# Patient Record
Sex: MALE
[De-identification: names, ages, dates, MRNs, and addresses within clinical notes are randomized per-mention and may not be internally consistent; named-entity substitution may affect disease eponyms.]

---

## 2023-04-13 ENCOUNTER — APPOINTMENT (OUTPATIENT)
Dept: ORTHOPEDIC SURGERY | Facility: CLINIC | Age: 23
End: 2023-04-13
Payer: COMMERCIAL

## 2023-04-13 DIAGNOSIS — S93.601A UNSPECIFIED SPRAIN OF RIGHT FOOT, INITIAL ENCOUNTER: ICD-10-CM

## 2023-04-13 PROBLEM — Z00.00 ENCOUNTER FOR PREVENTIVE HEALTH EXAMINATION: Status: ACTIVE | Noted: 2023-04-13

## 2023-04-13 PROCEDURE — 99203 OFFICE O/P NEW LOW 30 MIN: CPT

## 2023-04-13 PROCEDURE — 73630 X-RAY EXAM OF FOOT: CPT | Mod: RT

## 2023-04-13 PROCEDURE — 73610 X-RAY EXAM OF ANKLE: CPT | Mod: RT

## 2023-04-13 NOTE — PHYSICAL EXAM
[Right] : right foot and ankle [] : negative Khan test [FreeTextEntry8] : No pain to palpation throughout ankle/foot [FreeTextEntry9] : Good range of motion throughout with minimal pain to lateral ligaments/area of cuboid [de-identified] : Good strength throughout

## 2023-04-13 NOTE — DISCUSSION/SUMMARY
[de-identified] : Discussed in detail with patient mother that patient has a foot and ankle sprain.  Discussed that these injuries can last 4 to 6 weeks.  Discussed treatment options detail including ice/heat, range of motion exercise, warm water Epsom salt soaks.  Patient placed in Aircast weightbearing as tolerated with supportive sneaker.  Advised against high impact activities.  Advised over-the-counter Motrin/Tylenol as needed.  Call if symptoms worsen or change.  Patient will follow-up in 3 to 4 weeks for reevaluation with foot and ankle department.  Patient understands agrees with plan.  Seen under supervision of Dr. Burk.

## 2023-04-13 NOTE — HISTORY OF PRESENT ILLNESS
[de-identified] : Patient is a 22-year-old male accompanied with mother here for evaluation of right ankle/foot injury.  Patient states on 4/7/2023 he stepped down onto grass at his house and he twisted his right foot/ankle.  Patient states he was immediate pain but able to bear weight.  Patient states pain started to go away but over the past couple days pain has been coming back.  Patient denies repeat injury or trauma.  Patient has no pain at rest.  Patient has pain with ambulation to the lateral aspect of his foot/ankle.  Denies numbness/tingling.

## 2023-05-04 ENCOUNTER — APPOINTMENT (OUTPATIENT)
Dept: ORTHOPEDIC SURGERY | Facility: CLINIC | Age: 23
End: 2023-05-04

## 2023-05-24 ENCOUNTER — APPOINTMENT (OUTPATIENT)
Dept: ORTHOPEDIC SURGERY | Facility: CLINIC | Age: 23
End: 2023-05-24
Payer: COMMERCIAL

## 2023-05-24 VITALS — HEIGHT: 71 IN | WEIGHT: 165 LBS | BODY MASS INDEX: 23.1 KG/M2

## 2023-05-24 DIAGNOSIS — S93.491A SPRAIN OF OTHER LIGAMENT OF RIGHT ANKLE, INITIAL ENCOUNTER: ICD-10-CM

## 2023-05-24 PROCEDURE — 99213 OFFICE O/P EST LOW 20 MIN: CPT

## 2023-05-24 RX ORDER — MELOXICAM 7.5 MG/1
7.5 TABLET ORAL DAILY
Qty: 30 | Refills: 0 | Status: ACTIVE | COMMUNITY
Start: 2023-05-24 | End: 1900-01-01

## 2023-05-24 NOTE — PHYSICAL EXAM
[de-identified] : He is alert oriented x3.  He is pleasant and cooperative that exam.  I examined his right lower extremity.  There is some residual swelling anterolaterally.  The ankle feels stable.  He is able to demonstrate grossly intact range of motion.  Full strength.  Neurovascular intact distally.

## 2023-05-24 NOTE — DISCUSSION/SUMMARY
[de-identified] : Patient would benefit at this time from physical therapy.  He can take anti-inflammatory medication and ice as needed with physical therapy.  I will see him back as needed.  If he fails to improve despite this we will consider an MRI without contrast.  All questions answered

## 2023-05-24 NOTE — HISTORY OF PRESENT ILLNESS
[de-identified] : This is a 22-year-old patient here for follow-up of his right ankle.  He injured about 6 weeks ago.  He was seen by one of our physician assistants who immobilized him and since then the patient's been doing better.  He still has some pain walking around.  He localized the pain laterally alone.  Denies any interval trauma.